# Patient Record
Sex: FEMALE | Race: WHITE | Employment: FULL TIME | URBAN - METROPOLITAN AREA
[De-identification: names, ages, dates, MRNs, and addresses within clinical notes are randomized per-mention and may not be internally consistent; named-entity substitution may affect disease eponyms.]

---

## 2021-10-31 ENCOUNTER — HOSPITAL ENCOUNTER (EMERGENCY)
Age: 41
Discharge: HOME | End: 2021-10-31
Attending: EMERGENCY MEDICINE
Payer: COMMERCIAL

## 2021-10-31 VITALS
WEIGHT: 160 LBS | BODY MASS INDEX: 25.71 KG/M2 | HEART RATE: 88 BPM | HEIGHT: 66 IN | RESPIRATION RATE: 18 BRPM | TEMPERATURE: 98.2 F | SYSTOLIC BLOOD PRESSURE: 148 MMHG | DIASTOLIC BLOOD PRESSURE: 80 MMHG | OXYGEN SATURATION: 99 %

## 2021-10-31 DIAGNOSIS — H60.11 CELLULITIS OF RIGHT EAR: Primary | ICD-10-CM

## 2021-10-31 PROCEDURE — 99283 EMERGENCY DEPT VISIT LOW MDM: CPT

## 2021-10-31 RX ORDER — CEPHALEXIN 500 MG/1
500 CAPSULE ORAL 4 TIMES DAILY
Qty: 28 CAPSULE | Refills: 0 | Status: SHIPPED | OUTPATIENT
Start: 2021-10-31 | End: 2021-11-07

## 2021-10-31 ASSESSMENT — PAIN DESCRIPTION - PAIN TYPE: TYPE: ACUTE PAIN

## 2021-10-31 ASSESSMENT — PAIN SCALES - GENERAL: PAINLEVEL_OUTOF10: 5

## 2021-10-31 ASSESSMENT — PAIN - FUNCTIONAL ASSESSMENT: PAIN_FUNCTIONAL_ASSESSMENT: PREVENTS OR INTERFERES SOME ACTIVE ACTIVITIES AND ADLS

## 2021-10-31 ASSESSMENT — PAIN DESCRIPTION - LOCATION: LOCATION: EAR

## 2021-10-31 ASSESSMENT — PAIN DESCRIPTION - DESCRIPTORS: DESCRIPTORS: ACHING

## 2021-10-31 ASSESSMENT — PAIN DESCRIPTION - FREQUENCY: FREQUENCY: INTERMITTENT

## 2021-10-31 NOTE — ED PROVIDER NOTES
[address] [facility] ED  eMERGENCY dEPARTMENT eNCOUnter   Independent Attestation     Pt Name: Fady Palmer  MRN: [id number]  Armsargenisgfcalin 1980  Date of evaluation: 10/31/21       Fady Palmer is a 37 y.o. female who presents with Otalgia        Based on the medical record, the care appears appropriate. I was personally available for consultation in the Emergency Department.     Som Collier MD  Attending Emergency  Physician               Som Collier MD  10/31/21 [id number]

## 2021-10-31 NOTE — ED PROVIDER NOTES
[id number] [facility] ED  [address] [facility] [address]  Phone: [phone]  Fax: [phone]      eMERGENCY dEPARTMENT eNCOUnter      Pt Name: Madison Becker  MRN: [id number]  Sergogfcalin 1980  Date of evaluation: 10/31/21      CHIEF COMPLAINT:  Chief Complaint   Patient presents with   [facility] [address], [address] OF PRESENT ILLNESS    Madison Becker is a 37 y.o. female who presents for an INFECTION EVALUATION    Location/Symptom:   Redness/swelling of  Right ear  Timing/Onset:   1 day  Context/Setting:  Pt here with increasing warmth and redness of right external ear. Some burning feeling of external ear only reported but no f/c/n/v/otorrhea or hearing changes. No deeper ear/head/neck pain. Denies significant URI symptoms. Quality:  As above  Duration:   constant  Modifying Factors:  none  Severity:   Mild-Moderate    Nursing Notes were reviewed. REVIEW OF SYSTEMS       Constitutional: Per HPI  Eyes: No visual changes. Neck: No neck pain. Respiratory: Denies recent shortness of breath. Cardiac:  Denies recent chest pain. GI:  Per HPI  Musculoskeletal: Denies focal weakness. Neurologic:  Denies headache or focal weakness. Skin:   Per HPI    Negative in 10 essential Systems except as mentioned above and in the HPI. PAST MEDICAL HISTORY   PMH:  has no past medical history on file. Surgical History:  has no past surgical history on file. Social History:  reports that she has never smoked. She has never used smokeless tobacco. She reports that she does not drink alcohol and does not use drugs. Family History: None  Psychiatric History: None    Allergies:has No Known Allergies.       PHYSICAL EXAM     INITIAL VITALS: BP (!) 148/80   Pulse 88   Temp 98.2 °F (36.8 °C) (Oral)   Resp 18   Ht 5' 6\" (1.676 m)   Wt 72.6 kg (160 lb)   SpO2 99%   BMI 25.82 kg/m²     Constitutional:  Well developed   Eyes:  Pupils equal/round  HENT:  Atraumatic, left external ear/canal/TM wnl.  Right pinna with mild generalized edema, no significant edema. Some scant dry skin noted in single area only. No fluctuance/ecchympsis/induration of ext ear. canal and meatus wnl. Right TM wnl. No mastoid TTP/edema/erythema. nose normal. Neck- supple. No ant/post or periauricular lymphadenopathy. Respiratory:  Clear to auscultation bilaterally with good air exchange, no W/R/R  Cardiovascular:  RRR with normal S1 and S2  Musculoskeletal:  Normal to inspection. Integument:     Neurologic:  Alert and age appropriate interaction/mentation, no focal deficits noted       DIAGNOSTIC RESULTS     EKG: All EKG's are interpreted by the Emergency Department Physician who either signs or Co-signs this chart in the absence of a cardiologist.  Not indicated    RADIOLOGY:   Reviewed the radiologist:  No orders to display     Not indicated      LABS:  Labs Reviewed - No data to display  Not indicated or results as below    EMERGENCY DEPARTMENT COURSE:     1802  No overt significant cellulitis of ear but will cover with Keflex. Told her to fill now and even wait to see how this progresses as not significant/definitive at this time. Pt agreeable to plan. I have reviewed the disposition diagnosis with the patient and or their family/guardian. I have answered their questions and given discharge instructions. They voiced understanding of these instructions and did not have any further questions or complaints. Orders Placed This Encounter   Medications    cephALEXin (KEFLEX) 500 MG capsule     Sig: Take 1 capsule by mouth 4 times daily for 7 days     Dispense:  28 capsule     Refill:  0       CONSULTS:  None      FINAL IMPRESSION      1.  Cellulitis of right ear          DISPOSITION/PLAN:  DISPOSITION Decision To Discharge 10/31/2021 05:50:01 PM        PATIENT REFERRED TO:  SCOTT Morin - CNP  [address] [address]  [phone]    Schedule an appointment as soon as possible for a visit in 3 days  for re-evaluation of your symptoms    [facility] ED  [address] [place] [address]  [phone]  Go to   for re-evaluation of your symptoms      DISCHARGE MEDICATIONS:  Discharge Medication List as of 10/31/2021  5:51 PM      START taking these medications    Details   cephALEXin (KEFLEX) 500 MG capsule Take 1 capsule by mouth 4 times daily for 7 days, Disp-28 capsule, R-0Print             (Please note that portions of this note were completed with a voice recognition program.  Efforts were made to edit the dictations but occasionally words are mis-transcribed.)    Nan Client, WILBUR [place] Client, WILBUR  11/01/21 1115